# Patient Record
Sex: FEMALE | Race: WHITE
[De-identification: names, ages, dates, MRNs, and addresses within clinical notes are randomized per-mention and may not be internally consistent; named-entity substitution may affect disease eponyms.]

---

## 2018-04-29 ENCOUNTER — HOSPITAL ENCOUNTER (EMERGENCY)
Dept: HOSPITAL 96 - M.ERS | Age: 38
Discharge: HOME | End: 2018-04-29
Payer: COMMERCIAL

## 2018-04-29 VITALS — HEIGHT: 62 IN | WEIGHT: 200 LBS | BODY MASS INDEX: 36.8 KG/M2

## 2018-04-29 VITALS — SYSTOLIC BLOOD PRESSURE: 132 MMHG | DIASTOLIC BLOOD PRESSURE: 75 MMHG

## 2018-04-29 DIAGNOSIS — F17.210: ICD-10-CM

## 2018-04-29 DIAGNOSIS — J40: Primary | ICD-10-CM

## 2019-01-22 ENCOUNTER — HOSPITAL ENCOUNTER (EMERGENCY)
Dept: HOSPITAL 96 - M.ERS | Age: 39
Discharge: HOME | End: 2019-01-22
Payer: COMMERCIAL

## 2019-01-22 VITALS — HEIGHT: 62 IN | WEIGHT: 183.01 LBS | BODY MASS INDEX: 33.68 KG/M2

## 2019-01-22 VITALS — DIASTOLIC BLOOD PRESSURE: 62 MMHG | SYSTOLIC BLOOD PRESSURE: 101 MMHG

## 2019-01-22 DIAGNOSIS — M54.5: ICD-10-CM

## 2019-01-22 DIAGNOSIS — J98.8: Primary | ICD-10-CM

## 2019-01-22 DIAGNOSIS — Z87.01: ICD-10-CM

## 2019-01-22 DIAGNOSIS — F17.210: ICD-10-CM

## 2019-01-22 DIAGNOSIS — J45.909: ICD-10-CM

## 2019-01-22 LAB
ABSOLUTE MONOCYTES: 0.8 THOU/UL (ref 0–1.2)
ALBUMIN SERPL-MCNC: 3.2 G/DL (ref 3.4–5)
ALP SERPL-CCNC: 113 U/L (ref 46–116)
ALT SERPL-CCNC: 27 U/L (ref 30–65)
ANION GAP SERPL CALC-SCNC: 7 MMOL/L (ref 7–16)
AST SERPL-CCNC: 19 U/L (ref 15–37)
BILIRUB SERPL-MCNC: 0.4 MG/DL
BILIRUB UR-MCNC: NEGATIVE MG/DL
BUN SERPL-MCNC: 4 MG/DL (ref 7–18)
CALCIUM SERPL-MCNC: 8.7 MG/DL (ref 8.5–10.1)
CHLORIDE SERPL-SCNC: 102 MMOL/L (ref 98–107)
CO2 SERPL-SCNC: 26 MMOL/L (ref 21–32)
COLOR UR: YELLOW
CREAT SERPL-MCNC: 0.9 MG/DL (ref 0.6–1.3)
GLUCOSE SERPL-MCNC: 85 MG/DL (ref 70–99)
GRANULOCYTES NFR BLD MANUAL: 86 %
HCT VFR BLD CALC: 45.5 % (ref 37–47)
HGB BLD-MCNC: 15.8 GM/DL (ref 12–15)
KETONES UR STRIP-MCNC: NEGATIVE MG/DL
LG PLATELETS BLD QL SMEAR: (no result)
LYMPHOCYTES # BLD: 0.8 THOU/UL (ref 0.8–5.3)
LYMPHOCYTES NFR BLD AUTO: 7 %
MCH RBC QN AUTO: 33 PG (ref 26–34)
MCHC RBC AUTO-ENTMCNC: 34.8 G/DL (ref 28–37)
MCV RBC: 95 FL (ref 80–100)
MONOCYTES NFR BLD: 7 %
MPV: 7.3 FL. (ref 7.2–11.1)
NEUTROPHILS # BLD: 9.6 THOU/UL (ref 1.6–8.1)
NUCLEATED RBCS: 0 /100WBC
PLATELET # BLD EST: ADEQUATE 10*3/UL
PLATELET COUNT*: 297 THOU/UL (ref 150–400)
POTASSIUM SERPL-SCNC: 3.9 MMOL/L (ref 3.5–5.1)
PROT SERPL-MCNC: 7 G/DL (ref 6.4–8.2)
PROT UR QL STRIP: NEGATIVE
RBC # BLD AUTO: 4.79 MIL/UL (ref 4.2–5)
RBC # UR STRIP: NEGATIVE /UL
RBC MORPH BLD: NORMAL
RDW-CV: 13.8 % (ref 10.5–14.5)
SODIUM SERPL-SCNC: 135 MMOL/L (ref 136–145)
SP GR UR STRIP: 1.01 (ref 1–1.03)
URINE CLARITY: CLEAR
URINE GLUCOSE-RANDOM: NEGATIVE
URINE LEUKOCYTES-REFLEX: NEGATIVE
URINE NITRITE-REFLEX: NEGATIVE
UROBILINOGEN UR STRIP-ACNC: 0.2 E.U./DL (ref 0.2–1)
WBC # BLD AUTO: 11.2 THOU/UL (ref 4–11)

## 2019-03-05 ENCOUNTER — HOSPITAL ENCOUNTER (EMERGENCY)
Dept: HOSPITAL 96 - M.ERS | Age: 39
Discharge: LEFT BEFORE DISCHARGE | End: 2019-03-05
Payer: COMMERCIAL

## 2019-03-05 VITALS — DIASTOLIC BLOOD PRESSURE: 42 MMHG | SYSTOLIC BLOOD PRESSURE: 139 MMHG

## 2019-03-05 VITALS — WEIGHT: 191.01 LBS | HEIGHT: 63 IN | BODY MASS INDEX: 33.84 KG/M2

## 2019-03-05 DIAGNOSIS — F17.210: ICD-10-CM

## 2019-03-05 DIAGNOSIS — D72.829: ICD-10-CM

## 2019-03-05 DIAGNOSIS — J44.9: ICD-10-CM

## 2019-03-05 DIAGNOSIS — J18.9: Primary | ICD-10-CM

## 2019-03-05 LAB
ABSOLUTE BASOPHILS: 0.1 THOU/UL (ref 0–0.2)
ABSOLUTE EOSINOPHILS: 0.1 THOU/UL (ref 0–0.7)
ABSOLUTE MONOCYTES: 1 THOU/UL (ref 0–1.2)
ALBUMIN SERPL-MCNC: 3.1 G/DL (ref 3.4–5)
ALP SERPL-CCNC: 110 U/L (ref 46–116)
ALT SERPL-CCNC: 24 U/L (ref 30–65)
ANION GAP SERPL CALC-SCNC: 10 MMOL/L (ref 7–16)
AST SERPL-CCNC: 17 U/L (ref 15–37)
BASOPHILS NFR BLD AUTO: 0.6 %
BILIRUB SERPL-MCNC: 0.3 MG/DL
BUN SERPL-MCNC: 7 MG/DL (ref 7–18)
CALCIUM SERPL-MCNC: 8.2 MG/DL (ref 8.5–10.1)
CHLORIDE SERPL-SCNC: 102 MMOL/L (ref 98–107)
CO2 SERPL-SCNC: 24 MMOL/L (ref 21–32)
CREAT SERPL-MCNC: 0.8 MG/DL (ref 0.6–1.3)
EOSINOPHIL NFR BLD: 0.7 %
GLUCOSE SERPL-MCNC: 93 MG/DL (ref 70–99)
GRANULOCYTES NFR BLD MANUAL: 74.6 %
HCT VFR BLD CALC: 45.3 % (ref 37–47)
HGB BLD-MCNC: 15.3 GM/DL (ref 12–15)
LYMPHOCYTES # BLD: 2.2 THOU/UL (ref 0.8–5.3)
LYMPHOCYTES NFR BLD AUTO: 16.7 %
MCH RBC QN AUTO: 31.8 PG (ref 26–34)
MCHC RBC AUTO-ENTMCNC: 33.7 G/DL (ref 28–37)
MCV RBC: 94.5 FL (ref 80–100)
MONOCYTES NFR BLD: 7.4 %
MPV: 7.1 FL. (ref 7.2–11.1)
NEUTROPHILS # BLD: 9.9 THOU/UL (ref 1.6–8.1)
NT-PRO BRAIN NAT PEPTIDE: 50 PG/ML (ref ?–300)
NUCLEATED RBCS: 0 /100WBC
PLATELET COUNT*: 290 THOU/UL (ref 150–400)
POTASSIUM SERPL-SCNC: 3.7 MMOL/L (ref 3.5–5.1)
PROT SERPL-MCNC: 6.7 G/DL (ref 6.4–8.2)
RBC # BLD AUTO: 4.8 MIL/UL (ref 4.2–5)
RDW-CV: 13.9 % (ref 10.5–14.5)
SODIUM SERPL-SCNC: 136 MMOL/L (ref 136–145)
TROPONIN-I LEVEL: <0.06 NG/ML (ref ?–0.06)
WBC # BLD AUTO: 13.3 THOU/UL (ref 4–11)

## 2019-03-06 ENCOUNTER — HOSPITAL ENCOUNTER (EMERGENCY)
Dept: HOSPITAL 96 - M.ERS | Age: 39
Discharge: HOME | End: 2019-03-06
Payer: COMMERCIAL

## 2019-03-06 VITALS — HEIGHT: 63 IN | BODY MASS INDEX: 32.6 KG/M2 | WEIGHT: 184 LBS

## 2019-03-06 VITALS — DIASTOLIC BLOOD PRESSURE: 64 MMHG | SYSTOLIC BLOOD PRESSURE: 110 MMHG

## 2019-03-06 DIAGNOSIS — F17.210: ICD-10-CM

## 2019-03-06 DIAGNOSIS — R06.00: Primary | ICD-10-CM

## 2019-03-06 DIAGNOSIS — J44.9: ICD-10-CM

## 2019-03-06 LAB
ABSOLUTE BASOPHILS: 0.1 THOU/UL (ref 0–0.2)
ABSOLUTE EOSINOPHILS: 0.2 THOU/UL (ref 0–0.7)
ABSOLUTE MONOCYTES: 1.1 THOU/UL (ref 0–1.2)
ALBUMIN SERPL-MCNC: 3.1 G/DL (ref 3.4–5)
ALP SERPL-CCNC: 113 U/L (ref 46–116)
ALT SERPL-CCNC: 24 U/L (ref 30–65)
ANION GAP SERPL CALC-SCNC: 10 MMOL/L (ref 7–16)
APTT BLD: 28 SECONDS (ref 25–31.3)
AST SERPL-CCNC: 25 U/L (ref 15–37)
BASOPHILS NFR BLD AUTO: 0.8 %
BILIRUB SERPL-MCNC: 0.3 MG/DL
BUN SERPL-MCNC: 7 MG/DL (ref 7–18)
CALCIUM SERPL-MCNC: 8.7 MG/DL (ref 8.5–10.1)
CHLORIDE SERPL-SCNC: 104 MMOL/L (ref 98–107)
CO2 SERPL-SCNC: 22 MMOL/L (ref 21–32)
CREAT SERPL-MCNC: 0.9 MG/DL (ref 0.6–1.3)
EOSINOPHIL NFR BLD: 1 %
GLUCOSE SERPL-MCNC: 90 MG/DL (ref 70–99)
GRANULOCYTES NFR BLD MANUAL: 72.6 %
HCT VFR BLD CALC: 44.8 % (ref 37–47)
HGB BLD-MCNC: 15.4 GM/DL (ref 12–15)
INR PPP: 1
LYMPHOCYTES # BLD: 3.1 THOU/UL (ref 0.8–5.3)
LYMPHOCYTES NFR BLD AUTO: 18.7 %
MCH RBC QN AUTO: 32.3 PG (ref 26–34)
MCHC RBC AUTO-ENTMCNC: 34.3 G/DL (ref 28–37)
MCV RBC: 94.2 FL (ref 80–100)
MONOCYTES NFR BLD: 6.9 %
MPV: 7 FL. (ref 7.2–11.1)
NEUTROPHILS # BLD: 12 THOU/UL (ref 1.6–8.1)
NT-PRO BRAIN NAT PEPTIDE: 232 PG/ML (ref ?–300)
NUCLEATED RBCS: 0 /100WBC
PLATELET COUNT*: 296 THOU/UL (ref 150–400)
POTASSIUM SERPL-SCNC: 4 MMOL/L (ref 3.5–5.1)
PROT SERPL-MCNC: 6.9 G/DL (ref 6.4–8.2)
PROTHROMBIN TIME: 10 SECONDS (ref 9.2–11.5)
RBC # BLD AUTO: 4.76 MIL/UL (ref 4.2–5)
RDW-CV: 14 % (ref 10.5–14.5)
SODIUM SERPL-SCNC: 136 MMOL/L (ref 136–145)
TROPONIN-I LEVEL: <0.06 NG/ML (ref ?–0.06)
WBC # BLD AUTO: 16.5 THOU/UL (ref 4–11)

## 2019-03-06 NOTE — EKG
[place] [address]
Phone:  [phone]                     ELECTROCARDIOGRAM REPORT      
_______________________________________________________________________________
 
Name:       CLARIBEL GUADARRAMA                   Room:                      [facility]#:  [id number]      Account #:      [id number]  
Admission:  19     Attend Phys:                         
Discharge:  19     Date of Birth:  [birth year]  
         Report #: [id number]
    [id number]
_______________________________________________________________________________
THIS REPORT FOR:  //name//                      
 
                         [facility] ED
                                       
Test Date:    2019               Test Time:    14:03:10
Pat Name:     CLARIBEL GUADARRAMA               Department:   
Patient ID:   [id number]            Room:          
Gender:       F                        Technician:   
:          1980               Requested By: Raymundo Sneed
Order Number: [id number] MD:   Hiren Howard
                                 Measurements
Intervals                              Axis          
Rate:         103                      P:            37
[place]:           134                      QRS:          47
QRSD:         82                       T:            23
QT:           326                                    
QTc:          427                                    
                           Interpretive Statements
Sinus tachycardia
 
Electronically Signed On 3-6-2019 16:11:07 CST by Hiren Howard
[email or web address]
 
 
 
 
 
 
 
 
 
 
 
 
 
 
 
 
 
 
 
 
 
  <ELECTRONICALLY SIGNED>
                                           By: Hiren Howard MD, [facility]     
  19     1611
D: 19 1403   _____________________________________
T: 19 1403   Hiren Howard MD, FACC       /EPI

## 2019-04-26 ENCOUNTER — HOSPITAL ENCOUNTER (EMERGENCY)
Dept: HOSPITAL 96 - M.ERS | Age: 39
Discharge: HOME | End: 2019-04-26
Payer: COMMERCIAL

## 2019-04-26 VITALS — DIASTOLIC BLOOD PRESSURE: 75 MMHG | SYSTOLIC BLOOD PRESSURE: 132 MMHG

## 2019-04-26 VITALS — HEIGHT: 62 IN | BODY MASS INDEX: 35.51 KG/M2 | WEIGHT: 192.99 LBS

## 2019-04-26 DIAGNOSIS — R07.89: Primary | ICD-10-CM

## 2019-04-26 DIAGNOSIS — F17.210: ICD-10-CM

## 2019-04-26 DIAGNOSIS — R11.2: ICD-10-CM

## 2019-04-26 DIAGNOSIS — Z79.899: ICD-10-CM

## 2019-04-26 DIAGNOSIS — J44.9: ICD-10-CM

## 2019-04-26 LAB
ABSOLUTE BASOPHILS: 0.2 THOU/UL (ref 0–0.2)
ABSOLUTE EOSINOPHILS: 0.1 THOU/UL (ref 0–0.7)
ABSOLUTE MONOCYTES: 0.7 THOU/UL (ref 0–1.2)
ALBUMIN SERPL-MCNC: 3.4 G/DL (ref 3.4–5)
ALP SERPL-CCNC: 114 U/L (ref 46–116)
ALT SERPL-CCNC: 23 U/L (ref 30–65)
ANION GAP SERPL CALC-SCNC: 9 MMOL/L (ref 7–16)
AST SERPL-CCNC: 11 U/L (ref 15–37)
BASOPHILS NFR BLD AUTO: 1 %
BILIRUB SERPL-MCNC: 0.3 MG/DL
BUN SERPL-MCNC: 8 MG/DL (ref 7–18)
CALCIUM SERPL-MCNC: 8.9 MG/DL (ref 8.5–10.1)
CHLORIDE SERPL-SCNC: 106 MMOL/L (ref 98–107)
CO2 SERPL-SCNC: 26 MMOL/L (ref 21–32)
CREAT SERPL-MCNC: 0.9 MG/DL (ref 0.6–1.3)
EOSINOPHIL NFR BLD: 0.6 %
GLUCOSE SERPL-MCNC: 129 MG/DL (ref 70–99)
GRANULOCYTES NFR BLD MANUAL: 72.8 %
HCT VFR BLD CALC: 45.9 % (ref 37–47)
HGB BLD-MCNC: 15.3 GM/DL (ref 12–15)
INR PPP: 1
LIPASE: 126 U/L (ref 73–393)
LYMPHOCYTES # BLD: 3.9 THOU/UL (ref 0.8–5.3)
LYMPHOCYTES NFR BLD AUTO: 21.6 %
MCH RBC QN AUTO: 31.7 PG (ref 26–34)
MCHC RBC AUTO-ENTMCNC: 33.2 G/DL (ref 28–37)
MCV RBC: 95.4 FL (ref 80–100)
MONOCYTES NFR BLD: 4 %
MPV: 7.2 FL. (ref 7.2–11.1)
NEUTROPHILS # BLD: 13.1 THOU/UL (ref 1.6–8.1)
NT-PRO BRAIN NAT PEPTIDE: 59 PG/ML (ref ?–300)
NUCLEATED RBCS: 0 /100WBC
PLATELET COUNT*: 389 THOU/UL (ref 150–400)
POTASSIUM SERPL-SCNC: 4.2 MMOL/L (ref 3.5–5.1)
PROT SERPL-MCNC: 7.3 G/DL (ref 6.4–8.2)
PROTHROMBIN TIME: 9.8 SECONDS (ref 9.2–11.5)
RBC # BLD AUTO: 4.82 MIL/UL (ref 4.2–5)
RDW-CV: 13.8 % (ref 10.5–14.5)
SODIUM SERPL-SCNC: 141 MMOL/L (ref 136–145)
TROPONIN-I LEVEL: <0.06 NG/ML (ref ?–0.06)
WBC # BLD AUTO: 18 THOU/UL (ref 4–11)

## 2019-04-27 NOTE — EKG
[place] [address]
Phone:  [phone]                     ELECTROCARDIOGRAM REPORT      
_______________________________________________________________________________
 
Name:       CLARIBEL GUADARRAMA                   Room:                      [facility]#:  [id number]      Account #:      [id number]  
Admission:  19     Attend Phys:                         
Discharge:  19     Date of Birth:  [birth year]  
         Report #: [id number]
    [id number]
_______________________________________________________________________________
THIS REPORT FOR:  //name//                      
 
                         [facility] ED
                                       
Test Date:    2019               Test Time:    08:18:38
Pat Name:     CLARIBEL GUADARRAMA               Department:   
Patient ID:   [id number]            Room:          
Gender:       F                        Technician:   IRINA MANCERA
:          1980               Requested By: Kevin Castro
Order Number: [id number] MD:   Angel Powell
                                 Measurements
Intervals                              Axis          
Rate:         111                      P:            53
[place]:           126                      QRS:          60
QRSD:         84                       T:            26
QT:           320                                    
QTc:          435                                    
                           Interpretive Statements
Sinus tachycardia
Compared to ECG 2019 14:03:10
No significant changes
 
Electronically Signed On 2019 12:28:26 CDT by Angel Powell
[email or web address]
 
 
 
 
 
 
 
 
 
 
 
 
 
 
 
 
 
 
 
  <ELECTRONICALLY SIGNED>
                                           By: Angel Powell MD, [facility]   
  19     1228
D: 19   _____________________________________
T: 19   Angel Powell MD, FAC     /EPI

## 2021-03-08 ENCOUNTER — HOSPITAL ENCOUNTER (INPATIENT)
Dept: HOSPITAL 96 - M.ERS | Age: 41
LOS: 3 days | Discharge: HOME | DRG: 391 | End: 2021-03-11
Attending: INTERNAL MEDICINE | Admitting: INTERNAL MEDICINE
Payer: COMMERCIAL

## 2021-03-08 VITALS — SYSTOLIC BLOOD PRESSURE: 96 MMHG | DIASTOLIC BLOOD PRESSURE: 58 MMHG

## 2021-03-08 VITALS — DIASTOLIC BLOOD PRESSURE: 69 MMHG | SYSTOLIC BLOOD PRESSURE: 119 MMHG

## 2021-03-08 VITALS — SYSTOLIC BLOOD PRESSURE: 114 MMHG | DIASTOLIC BLOOD PRESSURE: 66 MMHG

## 2021-03-08 VITALS — WEIGHT: 221.3 LBS | BODY MASS INDEX: 40.21 KG/M2 | HEIGHT: 62.01 IN

## 2021-03-08 VITALS — SYSTOLIC BLOOD PRESSURE: 127 MMHG | DIASTOLIC BLOOD PRESSURE: 79 MMHG

## 2021-03-08 DIAGNOSIS — M79.7: ICD-10-CM

## 2021-03-08 DIAGNOSIS — Z79.899: ICD-10-CM

## 2021-03-08 DIAGNOSIS — K52.9: Primary | ICD-10-CM

## 2021-03-08 DIAGNOSIS — M06.9: ICD-10-CM

## 2021-03-08 DIAGNOSIS — F32.9: ICD-10-CM

## 2021-03-08 DIAGNOSIS — K64.4: ICD-10-CM

## 2021-03-08 DIAGNOSIS — M35.1: ICD-10-CM

## 2021-03-08 DIAGNOSIS — R73.9: ICD-10-CM

## 2021-03-08 DIAGNOSIS — F17.210: ICD-10-CM

## 2021-03-08 DIAGNOSIS — J44.9: ICD-10-CM

## 2021-03-08 DIAGNOSIS — Z20.822: ICD-10-CM

## 2021-03-08 DIAGNOSIS — F41.9: ICD-10-CM

## 2021-03-08 DIAGNOSIS — R65.11: ICD-10-CM

## 2021-03-08 LAB
ABSOLUTE MONOCYTES: 1.9 THOU/UL (ref 0–1.2)
ALBUMIN SERPL-MCNC: 3.2 G/DL (ref 3.4–5)
ALP SERPL-CCNC: 119 U/L (ref 46–116)
ALT SERPL-CCNC: 37 U/L (ref 30–65)
ANION GAP SERPL CALC-SCNC: 10 MMOL/L (ref 7–16)
AST SERPL-CCNC: 18 U/L (ref 15–37)
BILIRUB SERPL-MCNC: 0.4 MG/DL
BILIRUB UR-MCNC: NEGATIVE MG/DL
BUN SERPL-MCNC: 5 MG/DL (ref 7–18)
CALCIUM SERPL-MCNC: 9.2 MG/DL (ref 8.5–10.1)
CHLORIDE SERPL-SCNC: 104 MMOL/L (ref 98–107)
CO2 SERPL-SCNC: 28 MMOL/L (ref 21–32)
COLOR UR: YELLOW
CREAT SERPL-MCNC: 0.8 MG/DL (ref 0.6–1.3)
GLUCOSE SERPL-MCNC: 97 MG/DL (ref 70–99)
GRANULOCYTES NFR BLD MANUAL: 68 %
HCT VFR BLD CALC: 43.2 % (ref 37–47)
HGB BLD-MCNC: 14.4 GM/DL (ref 12–15)
KETONES UR STRIP-MCNC: NEGATIVE MG/DL
LIPASE: 151 U/L (ref 73–393)
LYMPHOCYTES # BLD: 5.8 THOU/UL (ref 0.8–5.3)
LYMPHOCYTES NFR BLD AUTO: 24 %
MCH RBC QN AUTO: 32.1 PG (ref 26–34)
MCHC RBC AUTO-ENTMCNC: 33.3 G/DL (ref 28–37)
MCV RBC: 96.5 FL (ref 80–100)
MONOCYTES NFR BLD: 8 %
MPV: 6.7 FL. (ref 7.2–11.1)
NEUTROPHILS # BLD: 16.5 THOU/UL (ref 1.6–8.1)
NUCLEATED RBCS: 0 /100WBC
PLATELET # BLD EST: ADEQUATE 10*3/UL
PLATELET COUNT*: 376 THOU/UL (ref 150–400)
POTASSIUM SERPL-SCNC: 3.9 MMOL/L (ref 3.5–5.1)
PROT SERPL-MCNC: 6.9 G/DL (ref 6.4–8.2)
PROT UR QL STRIP: NEGATIVE
RBC # BLD AUTO: 4.48 MIL/UL (ref 4.2–5)
RBC # UR STRIP: NEGATIVE /UL
RBC MORPH BLD: NORMAL
RDW-CV: 13.9 % (ref 10.5–14.5)
SODIUM SERPL-SCNC: 142 MMOL/L (ref 136–145)
SP GR UR STRIP: 1.02 (ref 1–1.03)
URINE CLARITY: CLEAR
URINE GLUCOSE-RANDOM: NEGATIVE
URINE LEUKOCYTES-REFLEX: NEGATIVE
URINE NITRITE-REFLEX: NEGATIVE
UROBILINOGEN UR STRIP-ACNC: 0.2 E.U./DL (ref 0.2–1)
WBC # BLD AUTO: 24.3 THOU/UL (ref 4–11)

## 2021-03-09 VITALS — SYSTOLIC BLOOD PRESSURE: 123 MMHG | DIASTOLIC BLOOD PRESSURE: 65 MMHG

## 2021-03-09 VITALS — DIASTOLIC BLOOD PRESSURE: 67 MMHG | SYSTOLIC BLOOD PRESSURE: 116 MMHG

## 2021-03-09 VITALS — DIASTOLIC BLOOD PRESSURE: 57 MMHG | SYSTOLIC BLOOD PRESSURE: 103 MMHG

## 2021-03-09 VITALS — DIASTOLIC BLOOD PRESSURE: 71 MMHG | SYSTOLIC BLOOD PRESSURE: 107 MMHG

## 2021-03-09 VITALS — SYSTOLIC BLOOD PRESSURE: 108 MMHG | DIASTOLIC BLOOD PRESSURE: 51 MMHG

## 2021-03-09 LAB
ALBUMIN SERPL-MCNC: 2.5 G/DL (ref 3.4–5)
ALP SERPL-CCNC: 100 U/L (ref 46–116)
ALT SERPL-CCNC: 33 U/L (ref 30–65)
ANION GAP SERPL CALC-SCNC: 8 MMOL/L (ref 7–16)
AST SERPL-CCNC: 25 U/L (ref 15–37)
BILIRUB SERPL-MCNC: 0.4 MG/DL
BUN SERPL-MCNC: 7 MG/DL (ref 7–18)
CALCIUM SERPL-MCNC: 7.8 MG/DL (ref 8.5–10.1)
CHLORIDE SERPL-SCNC: 106 MMOL/L (ref 98–107)
CO2 SERPL-SCNC: 25 MMOL/L (ref 21–32)
CREAT SERPL-MCNC: 0.8 MG/DL (ref 0.6–1.3)
GLUCOSE SERPL-MCNC: 78 MG/DL (ref 70–99)
HCT VFR BLD CALC: 38.1 % (ref 37–47)
HGB BLD-MCNC: 12.5 GM/DL (ref 12–15)
MCH RBC QN AUTO: 31.7 PG (ref 26–34)
MCHC RBC AUTO-ENTMCNC: 32.8 G/DL (ref 28–37)
MCV RBC: 96.5 FL (ref 80–100)
MPV: 7 FL. (ref 7.2–11.1)
PLATELET COUNT*: 305 THOU/UL (ref 150–400)
POTASSIUM SERPL-SCNC: 3.9 MMOL/L (ref 3.5–5.1)
PROT SERPL-MCNC: 5.7 G/DL (ref 6.4–8.2)
RBC # BLD AUTO: 3.95 MIL/UL (ref 4.2–5)
RDW-CV: 14.1 % (ref 10.5–14.5)
SODIUM SERPL-SCNC: 139 MMOL/L (ref 136–145)
WBC # BLD AUTO: 19.4 THOU/UL (ref 4–11)

## 2021-03-10 VITALS — SYSTOLIC BLOOD PRESSURE: 134 MMHG | DIASTOLIC BLOOD PRESSURE: 70 MMHG

## 2021-03-10 VITALS — SYSTOLIC BLOOD PRESSURE: 125 MMHG | DIASTOLIC BLOOD PRESSURE: 62 MMHG

## 2021-03-10 LAB
ALBUMIN SERPL-MCNC: 2.5 G/DL (ref 3.4–5)
ALP SERPL-CCNC: 117 U/L (ref 46–116)
ALT SERPL-CCNC: 43 U/L (ref 30–65)
ANION GAP SERPL CALC-SCNC: 7 MMOL/L (ref 7–16)
AST SERPL-CCNC: 29 U/L (ref 15–37)
BILIRUB SERPL-MCNC: 0.2 MG/DL
BUN SERPL-MCNC: 7 MG/DL (ref 7–18)
CALCIUM SERPL-MCNC: 8.7 MG/DL (ref 8.5–10.1)
CHLORIDE SERPL-SCNC: 108 MMOL/L (ref 98–107)
CO2 SERPL-SCNC: 26 MMOL/L (ref 21–32)
CREAT SERPL-MCNC: 0.8 MG/DL (ref 0.6–1.3)
ESR (SEDRATE): 10 MM/HR (ref 0–20)
GLUCOSE SERPL-MCNC: 116 MG/DL (ref 70–99)
HCT VFR BLD CALC: 36.7 % (ref 37–47)
HGB BLD-MCNC: 12 GM/DL (ref 12–15)
MAGNESIUM SERPL-MCNC: 2.1 MG/DL (ref 1.8–2.4)
MCH RBC QN AUTO: 31.8 PG (ref 26–34)
MCHC RBC AUTO-ENTMCNC: 32.6 G/DL (ref 28–37)
MCV RBC: 97.4 FL (ref 80–100)
MPV: 7.1 FL. (ref 7.2–11.1)
PLATELET COUNT*: 277 THOU/UL (ref 150–400)
POTASSIUM SERPL-SCNC: 3.9 MMOL/L (ref 3.5–5.1)
PREALB SERPL-MCNC: 20.5 MG/DL (ref 18–35.7)
PROT SERPL-MCNC: 5.6 G/DL (ref 6.4–8.2)
RBC # BLD AUTO: 3.77 MIL/UL (ref 4.2–5)
RDW-CV: 14 % (ref 10.5–14.5)
SODIUM SERPL-SCNC: 141 MMOL/L (ref 136–145)
WBC # BLD AUTO: 14.1 THOU/UL (ref 4–11)

## 2021-03-11 VITALS — DIASTOLIC BLOOD PRESSURE: 75 MMHG | SYSTOLIC BLOOD PRESSURE: 135 MMHG

## 2021-03-11 VITALS — SYSTOLIC BLOOD PRESSURE: 114 MMHG | DIASTOLIC BLOOD PRESSURE: 63 MMHG

## 2021-03-11 LAB
ABSOLUTE BASOPHILS: 0 THOU/UL (ref 0–0.2)
ABSOLUTE EOSINOPHILS: 0.3 THOU/UL (ref 0–0.7)
ABSOLUTE MONOCYTES: 0.9 THOU/UL (ref 0–1.2)
ANION GAP SERPL CALC-SCNC: 9 MMOL/L (ref 7–16)
BASOPHILS NFR BLD AUTO: 0.2 %
BUN SERPL-MCNC: 4 MG/DL (ref 7–18)
CALCIUM SERPL-MCNC: 8.1 MG/DL (ref 8.5–10.1)
CHLORIDE SERPL-SCNC: 108 MMOL/L (ref 98–107)
CO2 SERPL-SCNC: 23 MMOL/L (ref 21–32)
CREAT SERPL-MCNC: 0.8 MG/DL (ref 0.6–1.3)
EOSINOPHIL NFR BLD: 1.8 %
GLUCOSE SERPL-MCNC: 77 MG/DL (ref 70–99)
GRANULOCYTES NFR BLD MANUAL: 62.8 %
HCT VFR BLD CALC: 34.7 % (ref 37–47)
HGB BLD-MCNC: 11.4 GM/DL (ref 12–15)
LYMPHOCYTES # BLD: 4.1 THOU/UL (ref 0.8–5.3)
LYMPHOCYTES NFR BLD AUTO: 29 %
MCH RBC QN AUTO: 32.1 PG (ref 26–34)
MCHC RBC AUTO-ENTMCNC: 32.9 G/DL (ref 28–37)
MCV RBC: 97.5 FL (ref 80–100)
MONOCYTES NFR BLD: 6.2 %
MPV: 7 FL. (ref 7.2–11.1)
NEUTROPHILS # BLD: 8.9 THOU/UL (ref 1.6–8.1)
NUCLEATED RBCS: 0 /100WBC
PLATELET COUNT*: 266 THOU/UL (ref 150–400)
POTASSIUM SERPL-SCNC: 3.7 MMOL/L (ref 3.5–5.1)
RBC # BLD AUTO: 3.56 MIL/UL (ref 4.2–5)
RDW-CV: 14.1 % (ref 10.5–14.5)
SODIUM SERPL-SCNC: 140 MMOL/L (ref 136–145)
WBC # BLD AUTO: 14.1 THOU/UL (ref 4–11)

## 2021-03-11 PROCEDURE — 0DBL8ZZ EXCISION OF TRANSVERSE COLON, VIA NATURAL OR ARTIFICIAL OPENING ENDOSCOPIC: ICD-10-PCS | Performed by: INTERNAL MEDICINE

## 2021-11-07 ENCOUNTER — HOSPITAL ENCOUNTER (EMERGENCY)
Dept: HOSPITAL 96 - M.ERS | Age: 41
Discharge: HOME | End: 2021-11-07
Payer: MEDICAID

## 2021-11-07 VITALS — DIASTOLIC BLOOD PRESSURE: 58 MMHG | SYSTOLIC BLOOD PRESSURE: 95 MMHG

## 2021-11-07 VITALS — WEIGHT: 217 LBS | HEIGHT: 62 IN | BODY MASS INDEX: 39.93 KG/M2

## 2021-11-07 DIAGNOSIS — K52.9: Primary | ICD-10-CM

## 2021-11-07 DIAGNOSIS — Z79.899: ICD-10-CM

## 2021-11-07 DIAGNOSIS — R33.9: ICD-10-CM

## 2021-11-07 DIAGNOSIS — K59.00: ICD-10-CM

## 2021-11-07 DIAGNOSIS — F41.9: ICD-10-CM

## 2021-11-07 DIAGNOSIS — F32.9: ICD-10-CM

## 2021-11-07 DIAGNOSIS — Z87.42: ICD-10-CM

## 2021-11-07 DIAGNOSIS — F17.210: ICD-10-CM

## 2021-11-07 DIAGNOSIS — J44.9: ICD-10-CM

## 2021-11-07 LAB
ABSOLUTE EOSINOPHILS: 0.5 THOU/UL (ref 0–0.7)
ABSOLUTE MONOCYTES: 1 THOU/UL (ref 0–1.2)
ALBUMIN SERPL-MCNC: 3.2 G/DL (ref 3.4–5)
ALP SERPL-CCNC: 131 U/L (ref 46–116)
ALT SERPL-CCNC: 44 U/L (ref 30–65)
ANION GAP SERPL CALC-SCNC: 10 MMOL/L (ref 7–16)
AST SERPL-CCNC: 42 U/L (ref 15–37)
BILIRUB SERPL-MCNC: 0.2 MG/DL
BILIRUB UR-MCNC: NEGATIVE MG/DL
BUN SERPL-MCNC: 6 MG/DL (ref 7–18)
CALCIUM SERPL-MCNC: 8.3 MG/DL (ref 8.5–10.1)
CHLORIDE SERPL-SCNC: 103 MMOL/L (ref 98–107)
CO2 SERPL-SCNC: 24 MMOL/L (ref 21–32)
COLOR UR: YELLOW
CREAT SERPL-MCNC: 0.9 MG/DL (ref 0.6–1.3)
EOSINOPHIL NFR BLD: 2 %
GLUCOSE SERPL-MCNC: 122 MG/DL (ref 70–99)
GRANULOCYTES NFR BLD MANUAL: 70 %
HCT VFR BLD CALC: 39.5 % (ref 37–47)
HGB BLD-MCNC: 13.5 GM/DL (ref 12–15)
KETONES UR STRIP-MCNC: NEGATIVE MG/DL
LYMPHOCYTES # BLD: 5.8 THOU/UL (ref 0.8–5.3)
LYMPHOCYTES NFR BLD AUTO: 24 %
MCH RBC QN AUTO: 32.1 PG (ref 26–34)
MCHC RBC AUTO-ENTMCNC: 34.2 G/DL (ref 28–37)
MCV RBC: 93.7 FL (ref 80–100)
MONOCYTES NFR BLD: 4 %
MPV: 6.8 FL. (ref 7.2–11.1)
NEUTROPHILS # BLD: 16.9 THOU/UL (ref 1.6–8.1)
NUCLEATED RBCS: 0 /100WBC
PLATELET # BLD EST: ADEQUATE 10*3/UL
PLATELET COUNT*: 370 THOU/UL (ref 150–400)
POTASSIUM SERPL-SCNC: 3.6 MMOL/L (ref 3.5–5.1)
PROT SERPL-MCNC: 7 G/DL (ref 6.4–8.2)
PROT UR QL STRIP: NEGATIVE
RBC # BLD AUTO: 4.22 MIL/UL (ref 4.2–5)
RBC # UR STRIP: NEGATIVE /UL
RBC MORPH BLD: NORMAL
RDW-CV: 14.2 % (ref 10.5–14.5)
SODIUM SERPL-SCNC: 137 MMOL/L (ref 136–145)
SP GR UR STRIP: >= 1.03 (ref 1–1.03)
URINE CLARITY: CLEAR
URINE GLUCOSE-RANDOM: NEGATIVE
URINE LEUKOCYTES-REFLEX: NEGATIVE
URINE NITRITE-REFLEX: NEGATIVE
UROBILINOGEN UR STRIP-ACNC: 0.2 E.U./DL (ref 0.2–1)
WBC # BLD AUTO: 24.1 THOU/UL (ref 4–11)

## 2022-01-06 ENCOUNTER — HOSPITAL ENCOUNTER (EMERGENCY)
Dept: HOSPITAL 96 - M.ERS | Age: 42
Discharge: LEFT BEFORE DISCHARGE | End: 2022-01-06
Payer: MEDICAID

## 2022-01-06 VITALS — SYSTOLIC BLOOD PRESSURE: 123 MMHG | DIASTOLIC BLOOD PRESSURE: 65 MMHG

## 2022-01-06 VITALS — HEIGHT: 62 IN | WEIGHT: 207.01 LBS | BODY MASS INDEX: 38.09 KG/M2

## 2022-01-06 DIAGNOSIS — Z90.89: ICD-10-CM

## 2022-01-06 DIAGNOSIS — G43.909: Primary | ICD-10-CM

## 2022-01-06 DIAGNOSIS — F41.9: ICD-10-CM

## 2022-01-06 DIAGNOSIS — Z53.21: ICD-10-CM

## 2022-01-06 DIAGNOSIS — F32.9: ICD-10-CM

## 2022-01-06 DIAGNOSIS — J44.9: ICD-10-CM

## 2024-07-12 NOTE — EKG
[place] [address]
Phone:  [phone]                     ELECTROCARDIOGRAM REPORT      
_______________________________________________________________________________
 
Name:       CLARIBEL GUADARRAMA                   Room:                      [facility]#:  [id number]      Account #:      [id number]  
Admission:  19     Attend Phys:                         
Discharge:  19     Date of Birth:  [birth year]  
         Report #: [id number]
    [id number]
_______________________________________________________________________________
THIS REPORT FOR:  //name//                      
 
                         [facility] ED
                                       
Test Date:    2019               Test Time:    12:25:58
Pat Name:     CLARIBEL GUADARRAMA               Department:   
Patient ID:   [id number]            Room:          
Gender:       F                        Technician:   WENDY
:          1980               Requested By: Purvi Lyons
Order Number: [id number] MD:   Hiren Howard
                                 Measurements
Intervals                              Axis          
Rate:         97                       P:            38
[place]:           140                      QRS:          53
QRSD:         84                       T:            34
QT:           322                                    
QTc:          409                                    
                           Interpretive Statements
Sinus rhythm
No previous ECG available for comparison
 
Electronically Signed On 3-5-2019 16:37:31 CST by Hiren Howard
[email or web address]
 
 
 
 
 
 
 
 
 
 
 
 
 
 
 
 
 
 
 
 
  <ELECTRONICALLY SIGNED>
                                           By: Hiren Howard MD, [facility]     
  19     1637
D: 19 1225   _____________________________________
T: 19 1225   Hiren Howard MD, FACC       /EPI
13-Oct-2023